# Patient Record
Sex: MALE | Race: WHITE | NOT HISPANIC OR LATINO | Employment: FULL TIME | URBAN - METROPOLITAN AREA
[De-identification: names, ages, dates, MRNs, and addresses within clinical notes are randomized per-mention and may not be internally consistent; named-entity substitution may affect disease eponyms.]

---

## 2017-08-18 ENCOUNTER — ALLSCRIPTS OFFICE VISIT (OUTPATIENT)
Dept: OTHER | Facility: OTHER | Age: 32
End: 2017-08-18

## 2017-08-18 ENCOUNTER — APPOINTMENT (OUTPATIENT)
Dept: RADIOLOGY | Facility: CLINIC | Age: 32
End: 2017-08-18
Payer: COMMERCIAL

## 2017-08-18 ENCOUNTER — APPOINTMENT (OUTPATIENT)
Dept: LAB | Facility: CLINIC | Age: 32
End: 2017-08-18
Payer: COMMERCIAL

## 2017-08-18 DIAGNOSIS — B35.1 TINEA UNGUIUM: ICD-10-CM

## 2017-08-18 DIAGNOSIS — M54.31 SCIATICA OF RIGHT SIDE: ICD-10-CM

## 2017-08-18 LAB
ALBUMIN SERPL BCP-MCNC: 4 G/DL (ref 3.5–5)
ALP SERPL-CCNC: 69 U/L (ref 46–116)
ALT SERPL W P-5'-P-CCNC: 29 U/L (ref 12–78)
ANION GAP SERPL CALCULATED.3IONS-SCNC: 6 MMOL/L (ref 4–13)
AST SERPL W P-5'-P-CCNC: 19 U/L (ref 5–45)
BILIRUB SERPL-MCNC: 0.4 MG/DL (ref 0.2–1)
BUN SERPL-MCNC: 18 MG/DL (ref 5–25)
CALCIUM SERPL-MCNC: 8.8 MG/DL (ref 8.3–10.1)
CHLORIDE SERPL-SCNC: 104 MMOL/L (ref 100–108)
CO2 SERPL-SCNC: 30 MMOL/L (ref 21–32)
CREAT SERPL-MCNC: 1.03 MG/DL (ref 0.6–1.3)
GFR SERPL CREATININE-BSD FRML MDRD: 94 ML/MIN/1.73SQ M
GLUCOSE SERPL-MCNC: 93 MG/DL (ref 65–140)
POTASSIUM SERPL-SCNC: 4.6 MMOL/L (ref 3.5–5.3)
PROT SERPL-MCNC: 7.1 G/DL (ref 6.4–8.2)
SODIUM SERPL-SCNC: 140 MMOL/L (ref 136–145)

## 2017-08-18 PROCEDURE — 36415 COLL VENOUS BLD VENIPUNCTURE: CPT

## 2017-08-18 PROCEDURE — 72110 X-RAY EXAM L-2 SPINE 4/>VWS: CPT

## 2017-08-18 PROCEDURE — 80053 COMPREHEN METABOLIC PANEL: CPT

## 2017-08-21 ENCOUNTER — GENERIC CONVERSION - ENCOUNTER (OUTPATIENT)
Dept: OTHER | Facility: OTHER | Age: 32
End: 2017-08-21

## 2017-08-22 ENCOUNTER — GENERIC CONVERSION - ENCOUNTER (OUTPATIENT)
Dept: OTHER | Facility: OTHER | Age: 32
End: 2017-08-22

## 2017-08-30 ENCOUNTER — GENERIC CONVERSION - ENCOUNTER (OUTPATIENT)
Dept: OTHER | Facility: OTHER | Age: 32
End: 2017-08-30

## 2017-08-30 ENCOUNTER — APPOINTMENT (OUTPATIENT)
Dept: PHYSICAL THERAPY | Facility: CLINIC | Age: 32
End: 2017-08-30
Payer: COMMERCIAL

## 2017-08-30 DIAGNOSIS — M54.31 SCIATICA OF RIGHT SIDE: ICD-10-CM

## 2017-08-30 PROCEDURE — 97162 PT EVAL MOD COMPLEX 30 MIN: CPT

## 2017-08-30 PROCEDURE — 97110 THERAPEUTIC EXERCISES: CPT

## 2017-08-30 PROCEDURE — 97112 NEUROMUSCULAR REEDUCATION: CPT

## 2017-08-31 ENCOUNTER — APPOINTMENT (OUTPATIENT)
Dept: PHYSICAL THERAPY | Facility: CLINIC | Age: 32
End: 2017-08-31
Payer: COMMERCIAL

## 2017-08-31 PROCEDURE — 97140 MANUAL THERAPY 1/> REGIONS: CPT

## 2017-08-31 PROCEDURE — 97110 THERAPEUTIC EXERCISES: CPT

## 2017-08-31 PROCEDURE — 97112 NEUROMUSCULAR REEDUCATION: CPT

## 2017-09-14 ENCOUNTER — GENERIC CONVERSION - ENCOUNTER (OUTPATIENT)
Dept: OTHER | Facility: OTHER | Age: 32
End: 2017-09-14

## 2017-09-20 ENCOUNTER — GENERIC CONVERSION - ENCOUNTER (OUTPATIENT)
Dept: OTHER | Facility: OTHER | Age: 32
End: 2017-09-20

## 2017-09-25 DIAGNOSIS — B35.1 TINEA UNGUIUM: ICD-10-CM

## 2017-10-04 ENCOUNTER — GENERIC CONVERSION - ENCOUNTER (OUTPATIENT)
Dept: OTHER | Facility: OTHER | Age: 32
End: 2017-10-04

## 2018-01-14 VITALS
TEMPERATURE: 96.9 F | DIASTOLIC BLOOD PRESSURE: 64 MMHG | HEART RATE: 56 BPM | RESPIRATION RATE: 18 BRPM | WEIGHT: 195.56 LBS | BODY MASS INDEX: 25.1 KG/M2 | SYSTOLIC BLOOD PRESSURE: 110 MMHG | HEIGHT: 74 IN | OXYGEN SATURATION: 98 %

## 2018-01-15 NOTE — RESULT NOTES
Discussion/Summary   Normal x-ray of spine (no arthritis or other abnormalities noted)  Would recommend trying chiropractor + physical therapy, as discussed  Call if no better in the next 4-6 weeks, in which case we can consider referral to spine/pain specialists and/or MRI  Verified Results  * XR SPINE LUMBAR MINIMUM 4 VIEWS NON INJURY [id number] 02:53PM Jyl Case   TW Order Number: [id number]     Test Name Result Flag Reference   XR SPINE LUMBAR MINIMUM 4 VIEWS (Report)     LUMBAR SPINE     INDICATION: Several year history of low back pain  No specific injury  COMPARISON: None     VIEWS: AP, lateral, bilateral oblique and coned down projections     IMAGES: 5     FINDINGS:     Alignment is unremarkable  There is no radiographic evidence of acute fracture or destructive osseous lesion  No significant lumbar degenerative change noted  Visualized soft tissues appear unremarkable  IMPRESSION:     Normal examination         Workstation performed: [id number]     Signed by:   Allison Martinez DO   8/23/17

## 2018-01-16 NOTE — RESULT NOTES
Discussion/Summary   Can we let Mr Warden Chaves know that his liver enzymes came back normal, so I will go ahead and send in an Rx for 30-day supply of his toenail medication (Lamisil)  Will also print slip for repeat liver enzymes (CMP) in 1 month  Once this comes back, we can go ahead and refill Lamisil further  Verified Results  (1) COMPREHENSIVE METABOLIC PANEL [id number] 44:68AP Yves Brasher Order Number: [id number]     Test Name Result Flag Reference   GLUCOSE,RANDM 93 mg/dL     If the patient is fasting, the ADA then defines impaired fasting glucose as > 100 mg/dL and diabetes as > or equal to 123 mg/dL  Specimen collection should occur prior to Sulfasalazine administration due to the potential for falsely depressed results  Specimen collection should occur prior to Sulfapyridine administration due to the potential for falsely elevated results  SODIUM 140 mmol/L  136-145   POTASSIUM 4 6 mmol/L  3 5-5 3   CHLORIDE 104 mmol/L  100-108   CARBON DIOXIDE 30 mmol/L  21-32   ANION GAP (CALC) 6 mmol/L  4-13   BLOOD UREA NITROGEN 18 mg/dL  5-25   CREATININE 1 03 mg/dL  0 60-1 30   Standardized to IDMS reference method   CALCIUM 8 8 mg/dL  8 3-10 1   BILI, TOTAL 0 40 mg/dL  0 20-1 00   ALK PHOSPHATAS 69 U/L     ALT (SGPT) 29 U/L  12-78   Specimen collection should occur prior to Sulfasalazine administration due to the potential for falsely depressed results  AST(SGOT) 19 U/L  5-45   Specimen collection should occur prior to Sulfasalazine administration due to the potential for falsely depressed results  ALBUMIN 4 0 g/dL  3 5-5 0   TOTAL PROTEIN 7 1 g/dL  6 4-8 2   eGFR 94 ml/min/1 73sq m     National Kidney Disease Education Program recommendations are as follows:  GFR calculation is accurate only with a steady state creatinine  Chronic Kidney disease less than 60 ml/min/1 73 sq  meters  Kidney failure less than 15 ml/min/1 73 sq  meters         Plan  Onychomycosis    · (1) COMPREHENSIVE METABOLIC PANEL; Status:Active;  Requested for:[id number];   PMH: History of athlete's foot, Onychomycosis    · Terbinafine HCl - 250 MG Oral Tablet; TAKE 1 TABLET DAILY AS DIRECTED

## 2019-08-20 ENCOUNTER — TELEPHONE (OUTPATIENT)
Dept: OTHER | Facility: OTHER | Age: 34
End: 2019-08-20

## 2019-08-20 NOTE — TELEPHONE ENCOUNTER
Patient would like to know if Dr Patricia Araujo can renew his referral for Physical Therapy in [address] [place] [address]

## 2019-08-22 ENCOUNTER — NURSE TRIAGE (OUTPATIENT)
Dept: PHYSICAL THERAPY | Facility: OTHER | Age: 34
End: 2019-08-22

## 2019-08-22 DIAGNOSIS — G89.29 CHRONIC RIGHT-SIDED LOW BACK PAIN WITH RIGHT-SIDED SCIATICA: Primary | ICD-10-CM

## 2019-08-22 DIAGNOSIS — M54.41 CHRONIC RIGHT-SIDED LOW BACK PAIN WITH RIGHT-SIDED SCIATICA: Primary | ICD-10-CM

## 2019-08-22 NOTE — TELEPHONE ENCOUNTER
Background - Initial Assessment  Clinical complaint: Chronic low back pain, on the right side, with right sided sciatica, also has pain with his right hip  Pt stated that he did fracture his back when he was in high school, and this pain does feel different  Pt had an x-ray two years ago  Pt is taking Didi back and body, and using Aleve  Pt has done physical therapy in the past   Date of onset: 2017  Frequency of pain: persistent  Quality of pain: numb, very annoying, needs to change position often, and standing helps with the pain  Protocols used: [facility] COMPREHENSIVE SPINE PROGRAM PROTOCOL    This nurse did review in detail the comp spine program and what we can provide for the pt for their back pain  Pt is agreeable to being triaged by this nurse and would like to have physical therapy  Referrals were placed to the following:  Physical Therapy at the [facility] site  Pt was transferred to  to make evaluation appointment    PM&R with Alisia Opitz PA at [facility] [address] at the [facility]  Pt is aware that they will be receiving a phone call from that office to make their appointments  Pt is aware of who they will be seeing and location of that office  No further questions voiced at this time and Pt did state understanding of the referral that was placed

## 2019-08-29 ENCOUNTER — OFFICE VISIT (OUTPATIENT)
Dept: PAIN MEDICINE | Facility: CLINIC | Age: 34
End: 2019-08-29
Payer: COMMERCIAL

## 2019-08-29 VITALS
DIASTOLIC BLOOD PRESSURE: 80 MMHG | HEIGHT: 75 IN | HEART RATE: 72 BPM | BODY MASS INDEX: 24.25 KG/M2 | WEIGHT: 195 LBS | SYSTOLIC BLOOD PRESSURE: 118 MMHG

## 2019-08-29 DIAGNOSIS — G89.29 ACUTE EXACERBATION OF CHRONIC LOW BACK PAIN: ICD-10-CM

## 2019-08-29 DIAGNOSIS — M54.50 ACUTE EXACERBATION OF CHRONIC LOW BACK PAIN: ICD-10-CM

## 2019-08-29 DIAGNOSIS — G89.29 CHRONIC RIGHT-SIDED LOW BACK PAIN WITH RIGHT-SIDED SCIATICA: Primary | ICD-10-CM

## 2019-08-29 DIAGNOSIS — M54.41 CHRONIC RIGHT-SIDED LOW BACK PAIN WITH RIGHT-SIDED SCIATICA: Primary | ICD-10-CM

## 2019-08-29 PROCEDURE — 99204 OFFICE O/P NEW MOD 45 MIN: CPT | Performed by: PHYSICIAN ASSISTANT

## 2019-08-29 RX ORDER — PREDNISONE 1 MG/1
TABLET ORAL
Qty: 21 TABLET | Refills: 0 | Status: SHIPPED | OUTPATIENT
Start: 2019-08-29

## 2019-08-29 NOTE — PROGRESS NOTES
Assessment/Plan:      Diagnoses and all orders for this visit:    Chronic right-sided low back pain with right-sided sciatica  -     Ambulatory referral to Physical Medicine Rehab  -     predniSONE 5 mg tablet; Take 6 tablets by mouth on day 1, 5 tablets day 2, 4 tablets day 3, 3 tablets day 4, 2 tablets day 5, and 1 tablet on day 6  Acute exacerbation of chronic low back pain  -     predniSONE 5 mg tablet; Take 6 tablets by mouth on day 1, 5 tablets day 2, 4 tablets day 3, 3 tablets day 4, 2 tablets day 5, and 1 tablet on day 6  Other orders  -     Aspirin-Caffeine (RENATO BACK & BODY PO); Take by mouth       discussion:  49-year-old right-hand-dominant male presenting for chronic low back pain with right-sided sciatica likely due to lumbar derangement  Previous x-ray reviewed from 2018 was unremarkable for abnormality  Patient is scheduled to start physical therapy next week and will provide prednisone taper for acute inflammation  I discussed with the patient that at this point in time since I feel that there is a significant inflammatory component to their pain symptoms, that they would benefit from a titrating dose of oral steroids over the next 6 days  I advised the patient that while on the steroids, they should not take any other oral NSAIDs except for acetaminophen or Tylenol until they have completed the steroid taper  I also advised them that once they have completed the steroid taper, they are to give our office a follow-up phone call to let us know how they were doing and if there are any signs of improvement  The patient was agreeable and verbalized an understanding  Patient is to follow up in office as needed  Two consider further imaging such as MRI of lumbar spine if pain does not improve or worsens from therapy  Subjective:     Patient ID: Gildardo Gan is a 37 y o  male  HPI Referral from Comprehensive Spine Program for chronic low back pain with right sided sciatica   Hx of running injury with his daughter in 2016 and intermittent sciatica flares since  Has found previous PT and HEP beneficial      Pain is primarily right sided described as a constant nagging pain rated as a 1 or 2/10 on pain scale on average  Pain radiates into right buttock posterior thigh and lateral calf  Denies numbness tingling or weakness  Denies bowel bladder changes or saddle anesthesia  Exacerbated with prolonged sitting or walking  Hx of pars fracture as a teenager from playing basketball but no long term pain after healed  Taking Didi Back and Body as needed for pain relief  ADLs not limited at this time  Finds difficulty with gym rowing machine  PAST MEDICAL HISTORY  History reviewed  No pertinent past medical history  PAST SURGICAL HISTORY  History reviewed  No pertinent surgical history  FAMILY HISTORY  History reviewed  No pertinent family history  HOME MEDICATIONS  Current Outpatient Medications   Medication Sig Dispense Refill    Aspirin-Caffeine (DIDI BACK & BODY PO) Take by mouth      predniSONE 5 mg tablet Take 6 tablets by mouth on day 1, 5 tablets day 2, 4 tablets day 3, 3 tablets day 4, 2 tablets day 5, and 1 tablet on day 6  21 tablet 0     No current facility-administered medications for this visit  ALLERGIES  Penicillins      SOCIAL HISTORY  Social History     Substance and Sexual Activity   Alcohol Use Not on file     Social History     Substance and Sexual Activity   Drug Use Not on file     Social History     Tobacco Use   Smoking Status Not on file     Review of Systems   Constitutional: Positive for activity change  Negative for chills, fever and unexpected weight change  HENT: Negative for trouble swallowing  Eyes: Negative for visual disturbance  Respiratory: Negative for shortness of breath  Cardiovascular: Negative for chest pain and leg swelling  Gastrointestinal: Negative for constipation, diarrhea, nausea and vomiting     Endocrine: Negative for polydipsia, polyphagia and polyuria  Genitourinary: Negative for decreased urine volume, difficulty urinating and urgency  Musculoskeletal: Positive for back pain  Negative for arthralgias, gait problem, joint swelling, myalgias, neck pain and neck stiffness  Skin: Negative for color change, pallor and rash  Allergic/Immunologic: Negative for immunocompromised state  Neurological: Negative for weakness and numbness  Hematological: Does not bruise/bleed easily  Psychiatric/Behavioral: Negative for sleep disturbance  Objective:  Vitals:    08/29/19 1351   BP: 118/80   Pulse: 72       Imaging:  LUMBAR SPINE 8/18/17      INDICATION:  Several year history of low back pain   No specific injury      COMPARISON: None     VIEWS:  AP, lateral, bilateral oblique and coned down projections     IMAGES:  5     FINDINGS:     Alignment is unremarkable      There is no radiographic evidence of acute fracture or destructive osseous lesion      No significant lumbar degenerative change noted      Visualized soft tissues appear unremarkable      IMPRESSION:     Normal examination      Workstation performed: [id number]     Physical Exam   Constitutional: He is oriented to person, place, and time  He appears well-developed and well-nourished  HENT:   Head: Normocephalic and atraumatic  Eyes: Pupils are equal, round, and reactive to light  Conjunctivae are normal    Neck: Neck supple  Cardiovascular: Intact distal pulses  Pulmonary/Chest: Effort normal  No respiratory distress  Musculoskeletal:        Lumbar back: He exhibits decreased range of motion and tenderness  He exhibits no bony tenderness and no deformity  TTP right SIJ     Relief of pain with right SARITA, - FADIR/Log roll bilaterally    Neurological: He is alert and oriented to person, place, and time  He has normal strength  He displays no atrophy and normal reflexes   A sensory deficit (mild hypoesthesia lateral right calf  ) is present  No cranial nerve deficit  He exhibits normal muscle tone  He displays no seizure activity  Coordination and gait normal    Reflex Scores:       Tricep reflexes are 2+ on the right side and 2+ on the left side  Bicep reflexes are 2+ on the right side and 2+ on the left side  Brachioradialis reflexes are 2+ on the right side and 2+ on the left side  Patellar reflexes are 2+ on the right side and 2+ on the left side  Achilles reflexes are 2+ on the right side and 2+ on the left side   - SLR  Able to heel walk, toe walk, squat and rise    Skin: Skin is warm and dry  Capillary refill takes less than 2 seconds  No rash noted  No erythema  No pallor  Psychiatric: He has a normal mood and affect  His speech is normal and behavior is normal  Judgment and thought content normal  He is attentive  Vitals reviewed

## 2019-08-29 NOTE — PATIENT INSTRUCTIONS
Prednisone (By mouth)   Prednisone (PRED-ni-sone)  Treats many diseases and conditions, especially problems related to inflammation  This medicine is a corticosteroid  Brand Name(s): Contrast Allergy PreMed Pack, Martha, predniSONE Intensol   There may be other brand names for this medicine  When This Medicine Should Not Be Used: This medicine is not right for everyone  Do not use if you had an allergic reaction to prednisone or if you are pregnant  How to Use This Medicine:   Liquid, Tablet, Delayed Release Tablet  · Take your medicine as directed  Your dose may need to be changed several times to find what works best for you  · It is best to take this medicine with food or milk  · Swallow the delayed-release tablet whole  Do not crush, break, or chew it  · Measure the oral liquid medicine with a marked measuring spoon, oral syringe, or medicine cup  · Missed dose: Take a dose as soon as you remember  If it is almost time for your next dose, wait until then and take a regular dose  Do not take extra medicine to make up for a missed dose  · Store the medicine in a closed container at room temperature, away from heat, moisture, and direct light  Do not freeze the oral liquid  Drugs and Foods to Avoid:   Ask your doctor or pharmacist before using any other medicine, including over-the-counter medicines, vitamins, and herbal products  · Tell your doctor if you use any of the following:  ¨ Aminoglutethimide, amphotericin B, carbamazepine, cholestyramine, cyclosporine, digoxin, isoniazid, ketoconazole, phenobarbital, phenytoin, or rifampin  ¨ Blood thinner, such as warfarin  ¨ NSAID pain or arthritis medicine, such as aspirin, diclofenac, ibuprofen, naproxen, celecoxib  ¨ Diuretic (water pill)  ¨ Diabetes medicine  ¨ Macrolide antibiotic, such as azithromycin, clarithromycin, erythromycin  ¨ Estrogen, including birth control pills or hormone replacement therapy  · This medicine may interfere with vaccines  Ask your doctor before you get a flu shot or any other vaccines  Warnings While Using This Medicine:   · It is not safe to take this medicine during pregnancy  It could harm an unborn baby  Tell your doctor right away if you become pregnant  · Tell your doctor if you are breastfeeding or if you have kidney problems, heart failure, high blood pressure, a recent heart attack, diabetes, glaucoma, osteoporosis, or thyroid problems  Tell your doctor about any infection you have  Also tell your doctor if you have had mental or emotional problems (such as depression) or stomach or bowel problems (such as an ulcer or diverticulitis)  · This medicine may cause the following problems:  ¨ Mood or behavior changes  ¨ Higher blood pressure, retaining water, changes in salt or potassium levels in your body  ¨ Cataracts or glaucoma (with long-term use)  ¨ Weak bones or osteoporosis (with long-term use)  ¨ Slow growth in children (with long-term use)  ¨ Muscle problems (with high doses, especially if you have myasthenia gravis or similar nerve and muscle problems)  · Do not stop using this medicine suddenly  Your doctor will need to slowly decrease your dose before you stop it completely  · This medicine could cause you to get infections more easily  Tell your doctor right away if you are exposed to chicken pox, measles, or other serious infection  Tell your doctor if you had a serious infection in the past, such as tuberculosis or herpes  · Tell your doctor about any extra stress or anxiety in your life  Your dose might need to be changed for a short time  · Tell any doctor or dentist who treats you that you are using this medicine  This medicine may affect certain medical test results  · Keep all medicine out of the reach of children  Never share your medicine with anyone    Possible Side Effects While Using This Medicine:   Call your doctor right away if you notice any of these side effects:  · Allergic reaction: Itching or hives, swelling in your face or hands, swelling or tingling in your mouth or throat, chest tightness, trouble breathing  · Dark freckles, skin color changes, coldness, weakness, tiredness, nausea, vomiting, weight loss  · Depression, unusual thoughts, feelings, or behaviors, trouble sleeping  · Fever, chills, cough, sore throat, and body aches  · Muscle pain or weakness  · Rapid weight gain, swelling in your hands, ankles, or feet  · Severe stomach pain, nausea, vomiting, or red or black stools  · Skin changes or growths  · Trouble seeing, eye pain, headache  If you notice these less serious side effects, talk with your doctor:   · Increased appetite  · Round, puffy face  · Weight gain around your neck, upper back, breast, face, or waist  If you notice other side effects that you think are caused by this medicine, tell your doctor  Call your doctor for medical advice about side effects  You may report side effects to FDA at 2-459-FDA-1636  © 2017 [address]  Information is for End User's use only and may not be sold, redistributed or otherwise used for commercial purposes  The above information is an  only  It is not intended as medical advice for individual conditions or treatments  Talk to your doctor, nurse or pharmacist before following any medical regimen to see if it is safe and effective for you  Lower Back Exercises   WHAT YOU NEED TO KNOW:   What do I need to know about lower back exercises? Lower back exercises help heal and strengthen your back muscles to prevent another injury  Ask your healthcare provider if you need to see a physical therapist for more advanced exercises  · Do the exercises on a mat or firm surface  (not on a bed) to support your spine and prevent low back pain  · Move slowly and smoothly  Avoid fast or jerky motions  · Breathe normally  Do not hold your breath  · Stop if you feel pain    It is normal to feel some discomfort at first  Regular exercise will help decrease your discomfort over time  How do I perform lower back exercises safely? Your healthcare provider may recommend that you do back exercises 10 to 30 minutes each day  He may also recommend that you do exercises 1 to 3 times each day  Ask your healthcare provider which exercises are best for you and how often to do them  · Ankle pumps:  Lie on your back  Move your foot up (with your toes pointing toward your head)  Then, move your foot down (with your toes pointing away from you)  Repeat this exercise 10 times on each side  · Heel slides:  Lie on your back  Slowly bend one leg and then straighten it  Next, bend the other leg and then straighten it  Repeat 10 times on each side  · Pelvic tilt:  Lie on your back with your knees bent and feet flat on the floor  Place your arms in a relaxed position beside your body  Tighten the muscles of your abdomen and flatten your back against the floor  Hold for 5 seconds  Repeat 5 times  · Back stretch:  Lie on your back with your hands behind your head  Bend your knees and turn the lower half of your body to one side  Hold this position for 10 seconds  Repeat 3 times on each side  · Straight leg raises:  Lie on your back with one leg straight  Bend the other knee  Tighten your abdomen and then slowly lift the straight leg up about 6 to 12 inches off the floor  Hold for 1 to 5 seconds  Lower your leg slowly  Repeat 10 times on each leg  · Knee-to-chest:  Lie on your back with your knees bent and feet flat on the floor  Pull one of your knees toward your chest and hold it there for 5 seconds  Return your leg to the starting position  Lift the other knee toward your chest and hold for 5 seconds  Do this 5 times on each side  · Cat and camel:  Place your hands and knees on the floor  Arch your back upward toward the ceiling and lower your head   Round out your spine as much as you can  Hold for 5 seconds  Lift your head upward and push your chest downward toward the floor  Hold for 5 seconds  Do 3 sets or as directed  · Wall squats:  Stand with your back against a wall  Tighten the muscles of your abdomen  Slowly lower your body until your knees are bent at a 45 degree angle  Hold this position for 5 seconds  Slowly move back up to a standing position  Repeat 10 times  · Curl up:  Lie on your back with your knees bent and feet flat on the floor  Place your hands, palms down, underneath the curve in your lower back  Next, with your elbows on the floor, lift your shoulders and chest 2 to 3 inches  Keep your head in line with your shoulders  Hold this position for 5 seconds  When you can do this exercise without pain for 10 to 15 seconds, you may add a rotation  While your shoulders and chest are lifted off the ground, turn slightly to the left and hold  Repeat on the other side  · Bird dog:  Place your hands and knees on the floor  Keep your wrists directly below your shoulders and your knees directly below your hips  Pull your belly button in toward your spine  Do not flatten or arch your back  Tighten your abdominal muscles  Raise one arm straight out so that it is aligned with your head  Next, raise the leg opposite your arm  Hold this position for 15 seconds  Lower your arm and leg slowly and change sides  Do 5 sets  When should I seek immediate care? · You have severe pain that prevents you from moving  When should I contact my healthcare provider? · Your pain becomes worse  · You have new pain  · You have questions or concerns about your condition or care  CARE AGREEMENT:   You have the right to help plan your care  Learn about your health condition and how it may be treated  Discuss treatment options with your caregivers to decide what care you want to receive  You always have the right to refuse treatment   The above information is an  only  It is not intended as medical advice for individual conditions or treatments  Talk to your doctor, nurse or pharmacist before following any medical regimen to see if it is safe and effective for you  © 2017 [facility] Information is for End User's use only and may not be sold, redistributed or otherwise used for commercial purposes  All illustrations and images included in CareNotes® are the copyrighted property of A D A M , Inc  or Johnnie Tavarez

## 2021-10-07 ENCOUNTER — OFFICE VISIT (OUTPATIENT)
Dept: UROLOGY | Facility: CLINIC | Age: 36
End: 2021-10-07
Payer: COMMERCIAL

## 2021-10-07 VITALS
HEART RATE: 78 BPM | BODY MASS INDEX: 24.99 KG/M2 | SYSTOLIC BLOOD PRESSURE: 122 MMHG | WEIGHT: 201 LBS | DIASTOLIC BLOOD PRESSURE: 80 MMHG | HEIGHT: 75 IN

## 2021-10-07 DIAGNOSIS — Z30.2 ENCOUNTER FOR STERILIZATION: Primary | ICD-10-CM

## 2021-10-07 PROCEDURE — 99204 OFFICE O/P NEW MOD 45 MIN: CPT | Performed by: UROLOGY

## 2021-10-07 RX ORDER — ALPRAZOLAM 2 MG/1
2 TABLET ORAL ONCE
Qty: 1 TABLET | Refills: 0 | Status: SHIPPED | OUTPATIENT
Start: 2021-10-07 | End: 2022-03-24

## 2021-12-03 ENCOUNTER — TELEPHONE (OUTPATIENT)
Dept: UROLOGY | Facility: AMBULATORY SURGERY CENTER | Age: 36
End: 2021-12-03

## 2022-03-23 ENCOUNTER — NURSE TRIAGE (OUTPATIENT)
Dept: OTHER | Facility: OTHER | Age: 37
End: 2022-03-23

## 2022-03-23 NOTE — TELEPHONE ENCOUNTER
Unable to reach patient and phone lines are down for [facility] [place]  Please follow up with pt

## 2022-03-23 NOTE — TELEPHONE ENCOUNTER
Regarding: Medication Question  ----- Message from Harriet Farmer sent at 3/23/2022 10:54 AM EDT -----  "I have a procedure tomorrow for a vasectomy and I was given only 1 xanax   I wanted to know if that was correct "

## 2022-03-23 NOTE — TELEPHONE ENCOUNTER
Called and spoke with patient  Informed patient that he is only supposed to have 1 xanax for the procedure  Informed patient that he will need a  too and from if he is going to take medication

## 2022-03-24 ENCOUNTER — PROCEDURE VISIT (OUTPATIENT)
Dept: UROLOGY | Facility: CLINIC | Age: 37
End: 2022-03-24
Payer: COMMERCIAL

## 2022-03-24 VITALS
BODY MASS INDEX: 25.49 KG/M2 | HEIGHT: 75 IN | DIASTOLIC BLOOD PRESSURE: 70 MMHG | WEIGHT: 205 LBS | SYSTOLIC BLOOD PRESSURE: 110 MMHG | HEART RATE: 78 BPM

## 2022-03-24 DIAGNOSIS — Z30.2 ENCOUNTER FOR STERILIZATION: Primary | ICD-10-CM

## 2022-03-24 PROCEDURE — 88302 TISSUE EXAM BY PATHOLOGIST: CPT | Performed by: PATHOLOGY

## 2022-03-24 PROCEDURE — 55250 REMOVAL OF SPERM DUCT(S): CPT | Performed by: UROLOGY

## 2022-03-24 RX ORDER — HYDROCODONE BITARTRATE AND ACETAMINOPHEN 5; 325 MG/1; MG/1
1 TABLET ORAL EVERY 6 HOURS PRN
Qty: 5 TABLET | Refills: 0 | Status: SHIPPED | OUTPATIENT
Start: 2022-03-24

## 2022-03-24 NOTE — PROGRESS NOTES
UROLOGY PROCEDURE NOTE     CHIEF COMPLAINT   Zee Webster is a 42 y o  male with a complaint of   Chief Complaint   Patient presents with    Vasectomy       History of Present Illness:     42 y o  male who presents for discussion of vasectomy  Patient is a wealth manager  He is  has 4 children girls aged 6 to age 3  He has not had any prior surgery on the abdomen groin or testicles  He has no scrotal swelling or pain  Patient returns today for vasectomy  Did take his sedative    Past Medical History:   No past medical history on file  PAST SURGICAL HISTORY:     Past Surgical History:   Procedure Laterality Date    VASECTOMY  03/24/2022       CURRENT MEDICATIONS:     Current Outpatient Medications   Medication Sig Dispense Refill    ALPRAZolam (XANAX) 2 MG tablet Take 1 tablet (2 mg total) by mouth once for 1 dose One tab prior to procedure, 30 mins 1 tablet 0    Aspirin-Caffeine (RENATO BACK & BODY PO) Take by mouth        predniSONE 5 mg tablet Take 6 tablets by mouth on day 1, 5 tablets day 2, 4 tablets day 3, 3 tablets day 4, 2 tablets day 5, and 1 tablet on day 6  (Patient not taking: Reported on 10/7/2021) 21 tablet 0     No current facility-administered medications for this visit  ALLERGIES:     Allergies   Allergen Reactions    Penicillins Vomiting       SOCIAL HISTORY:     Social History     Socioeconomic History    Marital status: /Civil Union     Spouse name: Not on file    Number of children: Not on file    Years of education: Not on file    Highest education level: Not on file   Occupational History    Not on file   Tobacco Use    Smoking status: Current Some Day Smoker     Types: Cigars    Smokeless tobacco: Never Used    Tobacco comment: in the summer   Vaping Use    Vaping Use: Never used   Substance and Sexual Activity    Alcohol use:  Yes    Drug use: Not Currently    Sexual activity: Not on file   Other Topics Concern    Not on file   Social History Narrative    Not on file     Social Determinants of Health     Financial Resource Strain: Not on file   Food Insecurity: Not on file   Transportation Needs: Not on file   Physical Activity: Not on file   Stress: Not on file   Social Connections: Not on file   Intimate Partner Violence: Not on file   Housing Stability: Not on file       SOCIAL HISTORY:   No family history on file  REVIEW OF SYSTEMS:     Review of Systems   Constitutional: Negative  Respiratory: Negative  Cardiovascular: Negative  Gastrointestinal: Negative  Genitourinary: Negative  Negative for scrotal swelling and testicular pain  Musculoskeletal: Negative  Skin: Negative  Psychiatric/Behavioral: Negative  PHYSICAL EXAM:     /70 (BP Location: Left arm, Patient Position: Sitting, Cuff Size: Adult)   Pulse 78   Ht 6' 3" (1 905 m)   Wt 93 kg (205 lb)   BMI 25 62 kg/m²     General:  Healthy appearing male in no acute distress  They have a normal affect  There is not appear to be any gross neurologic defects or abnormalities  HEENT:  Normocephalic, atraumatic  Neck is supple without any palpable lymphadenopathy  Cardiovascular:  Patient has normal palpable distal radial pulses  There is no significant peripheral edema  No JVD is noted  Respiratory:  Patient has unlabored respirations  There is no audible wheeze or rhonchi  Abdomen:  Abdomen is without surgical scars  Abdomen is soft and nontender  There is no tympany  Inguinal and umbilical hernia are not appreciated  Genitourinary: no penile lesions or discharge, no testicular masses or tenderness, no hernias, easily palpable bilateral vas deferens    Musculoskeletal:  Patient does not have significant CVA tenderness in the  flank with palpation or percussion  They full range of motion in all 4 extremities  Strength in all 4 extremities appears congruent  Patient is able to ambulate without assistance or difficulty    Dermatologic: Patient has no skin abnormalities or rashes  LABS:     CBC: No results found for: WBC, HGB, HCT, MCV, PLT    BMP:   Lab Results   Component Value Date    CALCIUM 8 8 08/18/2017    K 4 6 08/18/2017    CO2 30 08/18/2017     08/18/2017    BUN 18 08/18/2017    CREATININE 1 03 08/18/2017     PROCEDURE:     SEE NOTE    ASSESSMENT:     42 y o  male with desire for elective sterilization    PLAN:     The patient is status post vasectomy  I recommended rest, ice, and scrotal support  The patient will not be returning for post vasectomy checkup but will instead contact me if there are issues with his recuperation and recovery  He can be seen at that time if there are issues  I have prescribed Norco to take as needed  The patient understands that he is not yet considered sterile  I have recommended semen analyses 8 weeks post procedure  In the interim I have recommended contraception to avoid an undesired pregnancy  Our office will contact him once his pathology returns

## 2022-03-24 NOTE — PATIENT INSTRUCTIONS
Vasectomy   WHAT YOU NEED TO KNOW:   A vasectomy is a procedure to make you sterile  It is a permanent form of birth control  The vas deferens (sperm tubes) are cut so that the semen does not contain sperm  DISCHARGE INSTRUCTIONS:   Medicines: You may need any of the following:  · NSAIDs  help decrease swelling, pain, and fever  This medicine can be bought with or without a doctor's order  This medicine can cause stomach bleeding or kidney problems in certain people  If you take blood thinner medicine, always ask your healthcare provider if NSAIDs are safe for you  Always read the medicine label and follow the directions on it before using this medicine  · Take your medicine as directed  Contact your healthcare provider if you think your medicine is not helping or if you have side effects  Tell him if you are allergic to any medicine  Keep a list of the medicines, vitamins, and herbs you take  Include the amounts, and when and why you take them  Bring the list or the pill bottles to follow-up visits  Carry your medicine list with you in case of an emergency  Decrease pain and swelling:   · Lie on your back  as much as possible the day of your procedure  Place a cushion such as a washcloth or small towel under your scrotum to elevate it  · Apply ice  on your scrotum for 15 to 20 minutes every hour for 2 days  Use an ice pack, or put crushed ice in a plastic bag  Cover it with a towel  Ice helps prevent tissue damage and decreases swelling and pain  · Wear an athletic supporter for at least 2 days  This will decrease pain and swelling, and protect your wound  Wound care:  Care for your wound as directed  Carefully wash the wound with soap and water  Dry the area and put on new, clean bandages as directed  Change your bandages when they get wet or dirty  Activity:  You may walk and drive normally the day after your procedure   Do not play sports, do yard work, or lift anything heavy until your healthcare provider says it is okay  You may need to wait up to a week before you have sex  Follow up with your healthcare provider or surgeon in 12 weeks: You will need to return to have your semen tested for sperm  Write down your questions so you remember to ask them during your visits  Contact your healthcare provider or surgeon if:   · You have a fever  · Your wound is red, swollen, or draining pus  · You feel pain or burning when you urinate  · You have worsening pain in your scrotum, even after you take medicine  · You have questions or concerns about your condition or care  Seek care immediately or call 911 if:   · Blood soaks through your bandage  · Your stitches come apart  · You see blood in your urine or semen  © 2017 [employer] Information is for End User's use only and may not be sold, redistributed or otherwise used for commercial purposes  All illustrations and images included in CareNotes® are the copyrighted property of [employer]  or [employer]  The above information is an  only  It is not intended as medical advice for individual conditions or treatments  Talk to your doctor, nurse or pharmacist before following any medical regimen to see if it is safe and effective for you

## 2022-03-24 NOTE — PROGRESS NOTES
Vasectomy     Date/Time 3/24/2022 10:00 AM     Performed by  Gladis Henriquez MD     Authorized by Gladis Henriquez MD      [place] Protocol   Timeout called at: 3/24/2022 10:01 AM   Patient identity confirmed: verbally with patient        Local anesthesia used: yes      Anesthesia: local infiltration     Anesthesia   Local anesthesia used: yes  Local Anesthetic: lidocaine 1% without epinephrine and bupivacaine 0 5% without epinephrine     Sedation   Patient sedated: yes  Sedation type: anxiolysis        Specimen: yes    Culture: no   Procedure Details   Procedure Notes:   Vasectomy Procedure Note:    Risks and benefits of vasectomy were previously discussed  Informed consent was obtained at his prior office visit  The patient has taken Ativan as prescribed  The patient was placed in the supine position  His genitalia was prepped with a Betadine solution and draped in a sterile fashion  The left vas deferens was grasped and presented to the superficial left scrotum  2% lidocaine was used to anesthetize the skin, subcutaneous tissue, and left vas deferens  A scalpeless opening was made in the scrotal skin  The left vas deferens was grasped and presented into the surgical field  A #15 blade was used to make a longitudinal incision in the sheath of the vas deferens  The vas itself was then dissected free from the surrounding tissue  Clamps were placed proximally and distally and a 1 cm segment of the vas deferens was excised  Silk ties were applied and exposed ends were fulgurated  Hemostasis was excellent  The vas was returned to its natural anatomic position  The same procedure was then repeated on the patient's right side  There was some congested vessels on the proximal portion so a second suture ligation was placed proximally  Chromic suture was placed through the skin and dartos to reapproximate both defects  Betadine was removed and Bacitracin ointment was applied      The patient tolerated the procedure well          Patient Transportation: confirmed  Patient tolerance: patient tolerated the procedure well with no immediate complications

## 2022-03-31 ENCOUNTER — TELEPHONE (OUTPATIENT)
Dept: UROLOGY | Facility: MEDICAL CENTER | Age: 37
End: 2022-03-31

## 2022-04-14 ENCOUNTER — OFFICE VISIT (OUTPATIENT)
Dept: UROLOGY | Facility: CLINIC | Age: 37
End: 2022-04-14

## 2022-04-14 VITALS
SYSTOLIC BLOOD PRESSURE: 120 MMHG | HEIGHT: 75 IN | DIASTOLIC BLOOD PRESSURE: 62 MMHG | BODY MASS INDEX: 25.61 KG/M2 | WEIGHT: 206 LBS | HEART RATE: 68 BPM

## 2022-04-14 DIAGNOSIS — Z30.2 ENCOUNTER FOR STERILIZATION: Primary | ICD-10-CM

## 2022-04-14 PROCEDURE — 99024 POSTOP FOLLOW-UP VISIT: CPT | Performed by: PHYSICIAN ASSISTANT

## 2022-04-14 NOTE — PROGRESS NOTES
Post vasectomy visit  4/14/2022      Chief Complaint   Patient presents with    Follow-up    Vasectomy           Assessment and Plan    42 y o  male managed by Dr Fadi Teixeira    1  Status post vasectomy performed 3/24/22    The patient is doing well with no complaints  He was provided verbal and written instructions regarding semen analysis testing at 8 weeks after his vasectomy took place  Advised to attempt to ejaculate 15 times prior to his semen analysis  We reviewed the importance of using protection/contraception to prevent un-intended pregnancy until sterility is confirmed with his semen analysis  He will call to review the results  Otherwise, we will follow up on an as-needed basis and he will begin prostate cancer screening at age 52  Patient understands and agrees to treatment plan  All questions and concerns addressed and answered  History of Present Illness  Keagan Morales is a 42 y o  male here for follow up evaluation status post vasectomy  He is doing well since the time of his procedure and denies any complications  Is healing well  Soreness after playing a round of golf yesterday  Pathology reveals complete cross sections of bilateral vas deferens  Denies any family history of prostate cancer- will start screening PSA/NATALIA at age 52  No voiding issues  Review of Systems   Constitutional: Negative  Respiratory: Negative  Cardiovascular: Negative  Genitourinary: Positive for testicular pain (soreness after activity)  Negative for decreased urine volume, difficulty urinating, dysuria, flank pain, frequency, hematuria, scrotal swelling and urgency  Musculoskeletal: Negative  Physical Exam    General: Well appearing, no distress, appears stated age  HEENT:  Normocephalic, atraumatic  Conjunctiva clear  Respiratory: Respirations nonlabored, with no cough or wheeze  Abdomen:  Soft nontender, without hernia  No suprapubic or CVA tenderness    Genitourinary: penis normal, testes descended bilaterally into the scrotum  Well healing scrotal incisions bilaterally s/p vasectomy without erythema drainage or ecchymosis  Bilateral spermatic cords remain mildly thickened and tender with palpation  No hematoma  Musculoskeletal:  Normal gait and range of motion without focal deficit  Neuro: No gross neurologic defect or abnormality  Steady unassisted gait  Speech and affect normal   Dermatologic: skin warm, dry; no rash erythema or ecchymosis          Past Medical History  History reviewed  No pertinent past medical history  Past Social History  Past Surgical History:   Procedure Laterality Date    VASECTOMY  03/24/2022       Past Family History  History reviewed  No pertinent family history  Past Social history  Social History     Socioeconomic History    Marital status: /Civil Union     Spouse name: Not on file    Number of children: Not on file    Years of education: Not on file    Highest education level: Not on file   Occupational History    Not on file   Tobacco Use    Smoking status: Current Some Day Smoker     Types: Cigars    Smokeless tobacco: Never Used    Tobacco comment: in the summer   Vaping Use    Vaping Use: Never used   Substance and Sexual Activity    Alcohol use:  Yes    Drug use: Not Currently    Sexual activity: Not on file   Other Topics Concern    Not on file   Social History Narrative    Not on file     Social Determinants of Health     Financial Resource Strain: Not on file   Food Insecurity: Not on file   Transportation Needs: Not on file   Physical Activity: Not on file   Stress: Not on file   Social Connections: Not on file   Intimate Partner Violence: Not on file   Housing Stability: Not on file       Current Medications  Current Outpatient Medications   Medication Sig Dispense Refill    Aspirin-Caffeine (RENATO BACK & BODY PO) Take by mouth        ALPRAZolam (XANAX) 2 MG tablet Take 1 tablet (2 mg total) by mouth once for 1 dose One tab prior to procedure, 30 mins 1 tablet 0    HYDROcodone-acetaminophen (NORCO) 5-325 mg per tablet Take 1 tablet by mouth every 6 (six) hours as needed for pain for up to 5 doses Max Daily Amount: 4 tablets (Patient not taking: Reported on 4/14/2022 ) 5 tablet 0    predniSONE 5 mg tablet Take 6 tablets by mouth on day 1, 5 tablets day 2, 4 tablets day 3, 3 tablets day 4, 2 tablets day 5, and 1 tablet on day 6  (Patient not taking: Reported on 10/7/2021) 21 tablet 0     No current facility-administered medications for this visit  Allergies  Allergies   Allergen Reactions    Penicillins Vomiting         Past Medical History, Social History, Family History, medications and allergies were reviewed  Vitals  Vitals:    04/14/22 1457   BP: 120/62   BP Location: Left arm   Patient Position: Sitting   Cuff Size: Adult   Pulse: 68   Weight: 93 4 kg (206 lb)   Height: 6' 3" (1 905 m)       Orders  Orders Placed This Encounter   Procedures    Semen analysis, post-vasectomy     This is a patient instruction: Please call Central Scheduling [phone] to make an appointment for testing  Please refer to patient instructions on the Post Vasectomy Form provided to you by your doctor  If you have additional questions on collection of your specimen, please call the Lab Call Center at [phone]           Standing Status:   Future     Standing Expiration Date:   4/14/2023

## 2024-05-07 ENCOUNTER — OFFICE VISIT (OUTPATIENT)
Dept: PODIATRY | Facility: CLINIC | Age: 39
End: 2024-05-07
Payer: COMMERCIAL

## 2024-05-07 VITALS — HEIGHT: 75 IN | WEIGHT: 205.8 LBS | BODY MASS INDEX: 25.59 KG/M2

## 2024-05-07 DIAGNOSIS — B35.3 TINEA PEDIS OF LEFT FOOT: Primary | ICD-10-CM

## 2024-05-07 DIAGNOSIS — B35.1 ONYCHOMYCOSIS: ICD-10-CM

## 2024-05-07 PROCEDURE — 99203 OFFICE O/P NEW LOW 30 MIN: CPT | Performed by: PODIATRIST

## 2024-05-07 RX ORDER — CICLOPIROX 80 MG/ML
SOLUTION TOPICAL
Qty: 6.6 ML | Refills: 5 | Status: SHIPPED | OUTPATIENT
Start: 2024-05-07 | End: 2025-03-03

## 2024-05-07 RX ORDER — CLOTRIMAZOLE AND BETAMETHASONE DIPROPIONATE 10; .64 MG/G; MG/G
CREAM TOPICAL 2 TIMES DAILY
Qty: 15 G | Refills: 1 | Status: SHIPPED | OUTPATIENT
Start: 2024-05-07 | End: 2024-06-04

## 2024-05-07 NOTE — PROGRESS NOTES
Name: Alexander Staton      : 1985      MRN: [id number]  Encounter Provider: Levi Arita DPM  Encounter Date: 2024   Encounter department: [facility] [place]    Assessment & Plan     1. Tinea pedis of left foot  -     clotrimazole-betamethasone (LOTRISONE) 1-0.05 % cream; Apply topically 2 (two) times a day for 28 days Apply thin layer to affected area twice daily.    2. Onychomycosis  -     ciclopirox (PENLAC) 8 % solution; Apply topically daily at bedtime Apply thin layer with applicator brush evenly over the entire nail at night.      Patient has symptoms consistent with tinea pedis as well as onychomycosis to the great toe left foot.    Will give him topical Penlac as well as Lotrisone for treatment.    Will recheck him in a few months if he does not improve we could consider oral Lamisil at that time.            Return in about 3 months (around 2024).    Subjective     Patient states that he had an injury to his great toe previously ever since then has had some thickening and dystrophic changes of the nail.  He also notices some skin changes on the left foot has tried some over-the-counter medications however has not had success with these.      Review of Systems   Constitutional:  Negative for chills and fever.   Respiratory:  Negative for shortness of breath and wheezing.    Cardiovascular:  Negative for chest pain and leg swelling.   Gastrointestinal:  Negative for diarrhea, nausea and vomiting.   Neurological:  Negative for numbness.       Current Outpatient Medications on File Prior to Visit   Medication Sig   • ALPRAZolam (XANAX) 2 MG tablet Take 1 tablet (2 mg total) by mouth once for 1 dose One tab prior to procedure, 30 mins   • Aspirin-Caffeine (RENATO BACK & BODY PO) Take by mouth   (Patient not taking: Reported on 2024)   • HYDROcodone-acetaminophen (NORCO) 5-325 mg per tablet Take 1 tablet by mouth every 6 (six) hours as needed for pain for up to 5 doses Max  "Daily Amount: 4 tablets (Patient not taking: Reported on 4/14/2022)   • predniSONE 5 mg tablet Take 6 tablets by mouth on day 1, 5 tablets day 2, 4 tablets day 3, 3 tablets day 4, 2 tablets day 5, and 1 tablet on day 6. (Patient not taking: Reported on 10/7/2021)       Objective     Ht 6' 3\" (1.905 m)   Wt 93.4 kg (205 lb 12.8 oz)   BMI 25.72 kg/m²     Physical Exam  Constitutional:       Appearance: Normal appearance.   Feet:      Comments: Vascular: Intact pedal pulses bilateral DP and PT.  Neurological: Gross protective sensation intact bilateral  Musculoskeletal: Muscle strength bilateral intact with dorsiflexion, inversion, eversion and plantarflexion.  Dermatological: No open lesions or ulcerations noted bilateral.  There is some erythematous and peeling areas noted to the webspaces as well as on the dorsal aspect and plantar aspect of the left foot.  There is some thickness and dystrophic appearance of the left great toenail.  No other areas of acute issues noted at this time.    Neurological:      Mental Status: He is alert.       "